# Patient Record
Sex: MALE | Race: ASIAN | NOT HISPANIC OR LATINO | URBAN - METROPOLITAN AREA
[De-identification: names, ages, dates, MRNs, and addresses within clinical notes are randomized per-mention and may not be internally consistent; named-entity substitution may affect disease eponyms.]

---

## 2018-10-03 ENCOUNTER — EMERGENCY (EMERGENCY)
Facility: HOSPITAL | Age: 33
LOS: 1 days | Discharge: HOME | End: 2018-10-03
Attending: EMERGENCY MEDICINE | Admitting: EMERGENCY MEDICINE
Payer: COMMERCIAL

## 2018-10-03 VITALS
HEART RATE: 77 BPM | TEMPERATURE: 98 F | RESPIRATION RATE: 16 BRPM | OXYGEN SATURATION: 99 % | DIASTOLIC BLOOD PRESSURE: 64 MMHG | SYSTOLIC BLOOD PRESSURE: 119 MMHG

## 2018-10-03 LAB

## 2018-10-03 PROCEDURE — 93975 VASCULAR STUDY: CPT | Mod: 26

## 2018-10-03 PROCEDURE — 76870 US EXAM SCROTUM: CPT | Mod: 26

## 2018-10-03 PROCEDURE — 99284 EMERGENCY DEPT VISIT MOD MDM: CPT

## 2018-10-03 NOTE — ED PROVIDER NOTE - PLAN OF CARE
Your diagnosis: groin pain    Discharge instructions:    1. Please follow-up with your Primary Care Doctor in 1-2 days.    2. Take any prescribed medications as instructed:    3. Others:    4. Be sure to return to the ED if you develop new or worsening symptoms. Specific signs and symptoms to be vigilant of: fever or chills, pain on urination, blood in the urine, cloudy urine, foul-smelling urine, increased frequency of urination, difficulty with urination, back pain, abdominal or pelvic pain.

## 2018-10-03 NOTE — ED PROVIDER NOTE - OBJECTIVE STATEMENT
33M with a diagnosis of chlamydia 1 month ago (s/p 1 day of azithromycin and another antibiotic he does not remember) presenting with 1 week of intermittent left inguinal pain. 33M with a diagnosis of chlamydia 1 month ago (s/p 1 day of azithromycin and another antibiotic he does not remember) presenting with 1 week of intermittent left inguinal pain. Patient had 2 transient episodes of sharp, stabbing pain. 33M with a diagnosis of chlamydia 1 month ago (s/p 1 day of azithromycin and another antibiotic he does not remember) presenting with 1 week of intermittent left inguinal pain. Patient had 2 transient episodes of sharp, stabbing pain and associated left testicular soreness.  Denies fever/chills, cp, sob, n/v/d, penile discharge.  +Mild dysuria.

## 2018-10-03 NOTE — ED PROVIDER NOTE - GENITOURINARY, MLM
No discharge, lesions.  No testicular tenderness to palpation, cremasteric reflex intact bilaterally No discharge, lesions.  No testicular tenderness to palpation, cremasteric reflex intact bilaterally, +mild TTP scrotal/inguinal area on left

## 2018-10-03 NOTE — ED PROVIDER NOTE - CARE PLAN
Principal Discharge DX:	Groin pain, left  Assessment and plan of treatment:	Your diagnosis: groin pain    Discharge instructions:    1. Please follow-up with your Primary Care Doctor in 1-2 days.    2. Take any prescribed medications as instructed:    3. Others:    4. Be sure to return to the ED if you develop new or worsening symptoms. Specific signs and symptoms to be vigilant of: fever or chills, pain on urination, blood in the urine, cloudy urine, foul-smelling urine, increased frequency of urination, difficulty with urination, back pain, abdominal or pelvic pain.

## 2018-10-05 LAB
BACTERIA UR CULT: SIGNIFICANT CHANGE UP
C TRACH RRNA SPEC QL NAA+PROBE: SIGNIFICANT CHANGE UP
N GONORRHOEA RRNA SPEC QL NAA+PROBE: DETECTED — SIGNIFICANT CHANGE UP
SPECIMEN SOURCE: SIGNIFICANT CHANGE UP
SPECIMEN SOURCE: SIGNIFICANT CHANGE UP

## 2018-10-06 NOTE — ED POST DISCHARGE NOTE - RESULT SUMMARY
Chlamydia negative. Gonorrhea positive. Note states pt had h/o chlamydia and treated with azithromycin and another antibiotic.

## 2018-10-06 NOTE — ED POST DISCHARGE NOTE - DETAILS
Called [phone] and [phone] left message for pt to call back ED. Plan: Admin team to call pt again Pt called back and states feeling better. Pt informed results and states he was told he had a h/o positive gonorrhea not positive chlamydia. Pt states he was treated already for infection but isn't sure what medicine was given Pt called back and states feeling better. mild ? burn with urinating but symptoms are improved. No penile d/c, fever, chills, rash. Pt informed results and states he was told he had a h/o positive gonorrhea not positive chlamydia. Pt states he was treated already for infection with azithromycin 1 gram and cefuroxime 500mg 2 tablets. Pt states he has abstained from any sexual intercourse/oral sex. Pt instructed he needs to tell all partners that they need to be tested and treated. Pt advised should continue to abstain from sex and to follow up with  IF s/s increase pt to return to ED. Pt expresses understanding of this information.